# Patient Record
(demographics unavailable — no encounter records)

---

## 2025-04-29 NOTE — DISCUSSION/SUMMARY
[de-identified] : concussion with LOC vestibular dysfunction improving slowly- now 4 weeks post. Here is the plan that we have set forth today. continue with vestibular PT start some light aerobic work with tennis flashcard work for math recall exercises follow up in 2 weeks. The patient and the family understands the plan of care as described above.  All questions have been answered. Thank you for allowing me to care for ERNESTO. Sincerely, Kayla Birch, DO, FAAP, CA[facility] I have spent 30 minutes of time on the encounter which excludes teaching and separately reported services.

## 2025-04-29 NOTE — DISCUSSION/SUMMARY
[de-identified] : concussion with LOC vestibular dysfunction improving slowly- now 4 weeks post. Here is the plan that we have set forth today. continue with vestibular PT start some light aerobic work with tennis flashcard work for math recall exercises follow up in 2 weeks. The patient and the family understands the plan of care as described above.  All questions have been answered. Thank you for allowing me to care for ERNESTO. Sincerely, Kayla Birch, DO, FAAP, CA[facility] I have spent 30 minutes of time on the encounter which excludes teaching and separately reported services.

## 2025-04-29 NOTE — IMAGING
[de-identified] : PHYSICAL EXAM: Constitutional: Well developed, Well nourished, No acute distress Psych: Appropriate appearance, affect, rate of speech. Eye contact readily made Eyes: EOMI, PERRLA, Normal conjunctiva Head, Ears, Nose, Mouth, Throat: Normal cephalic with no tender areas or signs of trauma. Normal appearing nose/nares. Normal oral mucosa, palate, and pharynx Respiratory:Normal effort, no respiratory distress, no cyanosis Cardiovascular: intact distal pulses, visible extremities are warm and well perfused Abdominal/GI: Not Examined Neurological:  CN 2-12 and DTRs patella were previously normal not repeated. Sensation upper and lower extremity: Normal Coordination: Normal finger to nose testing with dominant hand- well appearing Skin: Skin over exposed areas of both upper and lower extremities intact   Still very light sensitive _baseline per her.  Cervical Spine Neck Spasm: no overt spasm appreciated Tenderness: none ROM Flexion: Normal ROM Extension:Normal ROM Right Lateral Flexion: Normal ROM Left Lateral Flexion: Normal ROM Right Rotation: Normal ROM Left Rotation: Normal   Vestibular/Ocular Smooth pursuits: 0 beats of nystagmus with tracking Can track fast moving object   Reports No symptoms and has no physical signs with 5  repetitions of smooth pursuits   Saccades: Horizontal: Reports No symptom(s) but slowing with repetitions  of  30 Vertical:  Reports no symptom(s) but slowing with 15 then stopped because of pain(gave up today)   VOR/Gaze stability: Horizontal VOR:  No Symptoms and has No physical signs with 5  repetitions of horizontal  VOR/gaze stability: vertical VOR: No Symptoms and has No physical signs with 5  repetitions of vertical   VOR/gaze stability Visual motion sensitivity (seated):  deferred   Binocular convergence: Convergence blurry at cm of  6 Break (double) at cm of 4 Binocular recovery: Double to single at cm of 6 Blur to clear at cm of 7.5 Reports  NO symptom(s) and has no physical signs with convergence   Monocular (accommodation): Right clear to blur at cm of  7 Left clear to blur at cm of 6 Reports NO symptom(s) and has no physical signs with accommodation   Balance: normal eyes open and forward mild truncal instability with eyes open and backwards. deferred eyes closed.

## 2025-04-29 NOTE — HISTORY OF PRESENT ILLNESS
[de-identified] : 04/29/25: follow up concussion. Here with mom. Per mom on Sunday pt woke up and felt "tired". Pt reported a "focus" headache yesterday in school- 1 time, possibly a mildly stressful situation. Pt reported feeling dizzy today while doing Math work. Denes n/v.  She is sleeping at her baseline She is happy and has been very playful without limitation She is reading at her baseline again NO issues with font light sensitivity at her baseline. she overall is tolerating PT and has been showing steady progress to date. - seeing [facility] in [place].  04/17/2025: Patient here for follow up on her concussion. States improvement since last visit. Continued headaches with nausea, mainly present at nighttime after prolonged reading.  No vomiting.. Reports pressure in her head on two occasions yesterday.  has been outside playing and running around the last few days and looking fine.  overall eating and sleeping well. Has not been back to school in a while. resumes monday 4/21 04/07/2025: 9-year-old female presenting with mother in the exam room for concussion evaluation. DOI: 3/31/25 Number of lifetime concussions (including current): this would be her first. ATC: Sports: [employer], Tennis, Ice Skating  At time of injury: Reports on high bar stool chair swinging and fell off when she hit head on granite counter (at her aunts house). Hitting L shoulder and back during fall as well. Reports constant headache and nausea.  passed out- possible LOC then hit the floor. aunt came over to her and shook her and she woke up quickly. went [facility] the same day. - vomited on the way.  then in the ED she through up again. Went to [facility]- the next day. ( had x-ray for neck, back and shoulder- everything was fine per mom).  LOC: yes  Memory Loss:  Seizure: no Initial Symptoms: N/V, LOC, headache/dizziness    Attends: [employer]  Grade: 3rd grade  Allergies: Niacin    Review of Systems: Constitutional:  no fever, no recent weight loss HEENT: see HPI CV: negative Pulm: negative GI: negative : negative Neuro: see HPI Skin: negative Endocrine: negative Heme: negative MSK: See HPI.

## 2025-04-29 NOTE — HISTORY OF PRESENT ILLNESS
[de-identified] : 04/29/25: follow up concussion. Here with mom. Per mom on Sunday pt woke up and felt "tired". Pt reported a "focus" headache yesterday in school- 1 time, possibly a mildly stressful situation. Pt reported feeling dizzy today while doing Math work. Denes n/v.  She is sleeping at her baseline She is happy and has been very playful without limitation She is reading at her baseline again NO issues with font light sensitivity at her baseline. she overall is tolerating PT and has been showing steady progress to date. - seeing [facility] in [place].  04/17/2025: Patient here for follow up on her concussion. States improvement since last visit. Continued headaches with nausea, mainly present at nighttime after prolonged reading.  No vomiting.. Reports pressure in her head on two occasions yesterday.  has been outside playing and running around the last few days and looking fine.  overall eating and sleeping well. Has not been back to school in a while. resumes monday 4/21 04/07/2025: 9-year-old female presenting with mother in the exam room for concussion evaluation. DOI: 3/31/25 Number of lifetime concussions (including current): this would be her first. ATC: Sports: [employer], Tennis, Ice Skating  At time of injury: Reports on high bar stool chair swinging and fell off when she hit head on granite counter (at her aunts house). Hitting L shoulder and back during fall as well. Reports constant headache and nausea.  passed out- possible LOC then hit the floor. aunt came over to her and shook her and she woke up quickly. went [facility] the same day. - vomited on the way.  then in the ED she through up again. Went to [facility]- the next day. ( had x-ray for neck, back and shoulder- everything was fine per mom).  LOC: yes  Memory Loss:  Seizure: no Initial Symptoms: N/V, LOC, headache/dizziness    Attends: [employer]  Grade: 3rd grade  Allergies: Niacin    Review of Systems: Constitutional:  no fever, no recent weight loss HEENT: see HPI CV: negative Pulm: negative GI: negative : negative Neuro: see HPI Skin: negative Endocrine: negative Heme: negative MSK: See HPI.

## 2025-04-29 NOTE — IMAGING
[de-identified] : PHYSICAL EXAM: Constitutional: Well developed, Well nourished, No acute distress Psych: Appropriate appearance, affect, rate of speech. Eye contact readily made Eyes: EOMI, PERRLA, Normal conjunctiva Head, Ears, Nose, Mouth, Throat: Normal cephalic with no tender areas or signs of trauma. Normal appearing nose/nares. Normal oral mucosa, palate, and pharynx Respiratory:Normal effort, no respiratory distress, no cyanosis Cardiovascular: intact distal pulses, visible extremities are warm and well perfused Abdominal/GI: Not Examined Neurological:  CN 2-12 and DTRs patella were previously normal not repeated. Sensation upper and lower extremity: Normal Coordination: Normal finger to nose testing with dominant hand- well appearing Skin: Skin over exposed areas of both upper and lower extremities intact   Still very light sensitive _baseline per her.  Cervical Spine Neck Spasm: no overt spasm appreciated Tenderness: none ROM Flexion: Normal ROM Extension:Normal ROM Right Lateral Flexion: Normal ROM Left Lateral Flexion: Normal ROM Right Rotation: Normal ROM Left Rotation: Normal   Vestibular/Ocular Smooth pursuits: 0 beats of nystagmus with tracking Can track fast moving object   Reports No symptoms and has no physical signs with 5  repetitions of smooth pursuits   Saccades: Horizontal: Reports No symptom(s) but slowing with repetitions  of  30 Vertical:  Reports no symptom(s) but slowing with 15 then stopped because of pain(gave up today)   VOR/Gaze stability: Horizontal VOR:  No Symptoms and has No physical signs with 5  repetitions of horizontal  VOR/gaze stability: vertical VOR: No Symptoms and has No physical signs with 5  repetitions of vertical   VOR/gaze stability Visual motion sensitivity (seated):  deferred   Binocular convergence: Convergence blurry at cm of  6 Break (double) at cm of 4 Binocular recovery: Double to single at cm of 6 Blur to clear at cm of 7.5 Reports  NO symptom(s) and has no physical signs with convergence   Monocular (accommodation): Right clear to blur at cm of  7 Left clear to blur at cm of 6 Reports NO symptom(s) and has no physical signs with accommodation   Balance: normal eyes open and forward mild truncal instability with eyes open and backwards. deferred eyes closed.

## 2025-05-23 NOTE — HISTORY OF PRESENT ILLNESS
[de-identified] : 05/23/25: Follow up on concussion. Patient reports to feeling better since her last visit. Staes she is back to sports without issues presently. Accompanied by mother. has stopped PT at this point- struggled to make time- about 5 sessions in total and tolerating school, focus memory much better.   04/29/25: follow up concussion. Here with mom. Per mom on Sunday pt woke up and felt "tired". Pt reported a "focus" headache yesterday in school- 1 time, possibly a mildly stressful situation. Pt reported feeling dizzy today while doing Math work. Denes n/v.  She is sleeping at her baseline She is happy and has been very playful without limitation She is reading at her baseline again NO issues with font light sensitivity at her baseline. she overall is tolerating PT and has been showing steady progress to date. - seeing [facility] in [place].  04/17/2025: Patient here for follow up on her concussion. States improvement since last visit. Continued headaches with nausea, mainly present at nighttime after prolonged reading.  No vomiting.. Reports pressure in her head on two occasions yesterday.  has been outside playing and running around the last few days and looking fine.  overall eating and sleeping well. Has not been back to school in a while. resumes monday 4/21 04/07/2025: 9-year-old female presenting with mother in the exam room for concussion evaluation. DOI: 3/31/25 Number of lifetime concussions (including current): this would be her first. ATC: Sports: [employer], Tennis, Ice Skating  At time of injury: Reports on high bar stool chair swinging and fell off when she hit head on granite counter (at her aunts house). Hitting L shoulder and back during fall as well. Reports constant headache and nausea.  passed out- possible LOC then hit the floor. aunt came over to her and shook her and she woke up quickly. went [facility] the same day. - vomited on the way.  then in the ED she through up again. Went to [email or web address]- the next day. ( had x-ray for neck, back and shoulder- everything was fine per mom).  LOC: yes  Memory Loss:  Seizure: no Initial Symptoms: N/V, LOC, headache/dizziness    Attends: [employer]  Grade: 3rd grade  Allergies: Niacin    Review of Systems: Constitutional:  no fever, no recent weight loss HEENT: see HPI CV: negative Pulm: negative GI: negative : negative Neuro: see HPI Skin: negative Endocrine: negative Heme: negative MSK: See HPI.

## 2025-05-23 NOTE — DISCUSSION/SUMMARY
[de-identified] : concussion with LOC vestibular dysfunction resolved 2 months post Here is the plan that we have set forth today. cleared for return to gym and all activities The patient and the family understands the plan of care as described above.  All questions have been answered. Thank you for allowing me to care for ERNESTO. Sincerely, Kayla Birch, DO, FAAP, [facility] I have spent 30 minutes of time on the encounter which excludes teaching and separately reported services.

## 2025-05-23 NOTE — IMAGING
[de-identified] : PHYSICAL EXAM: Constitutional: Well developed, Well nourished, No acute distress Psych: Appropriate appearance, affect, rate of speech. Eye contact readily made Eyes: EOMI, PERRLA, Normal conjunctiva Head, Ears, Nose, Mouth, Throat: Normal cephalic with no tender areas or signs of trauma. Normal appearing nose/nares. Normal oral mucosa, palate, and pharynx Respiratory:Normal effort, no respiratory distress, no cyanosis Cardiovascular: intact distal pulses, visible extremities are warm and well perfused Abdominal/GI: Not Examined Neurological:  CN 2-12 and DTRs patella were previously normal not repeated. Sensation upper and lower extremity: Normal Coordination: Normal finger to nose testing with dominant hand- well appearing Skin: Skin over exposed areas of both upper and lower extremities intact   Still very light sensitive _baseline per her.  Cervical Spine Neck Spasm: no overt spasm appreciated Tenderness: none ROM Flexion: Normal ROM Extension:Normal ROM Right Lateral Flexion: Normal ROM Left Lateral Flexion: Normal ROM Right Rotation: Normal ROM Left Rotation: Normal   Vestibular/Ocular Smooth pursuits: 0 beats of nystagmus with tracking Can track fast moving object   Reports No symptoms and has no physical signs with 5  repetitions of smooth pursuits   Saccades: Horizontal: Reports No symptom(s); no signs with repetitions  of  30 Vertical:  Reports no symptom(s) , no signs with 20 repes   VOR/Gaze stability: Horizontal VOR:  No Symptoms and has No physical signs with 5  repetitions of horizontal  VOR/gaze stability: vertical VOR: No Symptoms and has No physical signs with 5  repetitions of vertical   VOR/gaze stability Visual motion sensitivity (seated):  deferred   Binocular convergence: Convergence blurry at cm of  7 Break (double) at cm of 6 Binocular recovery: Double to single at cm of 6.5 Blur to clear at cm of 8 Reports  NO symptom(s) and has no physical signs with convergence   Monocular (accommodation): Right clear to blur at cm of  7 Left clear to blur at cm of 8 Reports NO symptom(s) and has no physical signs with accommodation   Balance: normal with eyes open and closed forward and backwards